# Patient Record
Sex: MALE | URBAN - METROPOLITAN AREA
[De-identification: names, ages, dates, MRNs, and addresses within clinical notes are randomized per-mention and may not be internally consistent; named-entity substitution may affect disease eponyms.]

---

## 2023-01-06 ENCOUNTER — EMERGENCY (EMERGENCY)
Facility: HOSPITAL | Age: 48
LOS: 1 days | Discharge: HOME | End: 2023-01-06
Attending: STUDENT IN AN ORGANIZED HEALTH CARE EDUCATION/TRAINING PROGRAM | Admitting: STUDENT IN AN ORGANIZED HEALTH CARE EDUCATION/TRAINING PROGRAM
Payer: COMMERCIAL

## 2023-01-06 VITALS
HEART RATE: 60 BPM | RESPIRATION RATE: 18 BRPM | WEIGHT: 175.05 LBS | TEMPERATURE: 98 F | DIASTOLIC BLOOD PRESSURE: 122 MMHG | SYSTOLIC BLOOD PRESSURE: 213 MMHG

## 2023-01-06 VITALS
TEMPERATURE: 98 F | SYSTOLIC BLOOD PRESSURE: 176 MMHG | OXYGEN SATURATION: 98 % | HEART RATE: 58 BPM | DIASTOLIC BLOOD PRESSURE: 98 MMHG

## 2023-01-06 LAB
ALBUMIN SERPL ELPH-MCNC: 4.2 G/DL — SIGNIFICANT CHANGE UP (ref 3.3–5)
ALP SERPL-CCNC: 49 U/L — SIGNIFICANT CHANGE UP (ref 40–120)
ALT FLD-CCNC: 50 U/L — SIGNIFICANT CHANGE UP (ref 12–78)
ANION GAP SERPL CALC-SCNC: 7 MMOL/L — SIGNIFICANT CHANGE UP (ref 5–17)
APTT BLD: 27.9 SEC — SIGNIFICANT CHANGE UP (ref 27.5–35.5)
AST SERPL-CCNC: 28 U/L — SIGNIFICANT CHANGE UP (ref 15–37)
BASOPHILS # BLD AUTO: 0.06 K/UL — SIGNIFICANT CHANGE UP (ref 0–0.2)
BASOPHILS NFR BLD AUTO: 0.7 % — SIGNIFICANT CHANGE UP (ref 0–2)
BILIRUB SERPL-MCNC: 0.7 MG/DL — SIGNIFICANT CHANGE UP (ref 0.2–1.2)
BUN SERPL-MCNC: 15 MG/DL — SIGNIFICANT CHANGE UP (ref 7–23)
CALCIUM SERPL-MCNC: 9 MG/DL — SIGNIFICANT CHANGE UP (ref 8.5–10.1)
CHLORIDE SERPL-SCNC: 103 MMOL/L — SIGNIFICANT CHANGE UP (ref 96–108)
CK MB CFR SERPL CALC: 1.3 NG/ML — SIGNIFICANT CHANGE UP (ref 0–3.6)
CO2 SERPL-SCNC: 29 MMOL/L — SIGNIFICANT CHANGE UP (ref 22–31)
CREAT SERPL-MCNC: 1 MG/DL — SIGNIFICANT CHANGE UP (ref 0.5–1.3)
D DIMER BLD IA.RAPID-MCNC: <150 NG/ML DDU — SIGNIFICANT CHANGE UP
EGFR: 93 ML/MIN/1.73M2 — SIGNIFICANT CHANGE UP
EOSINOPHIL # BLD AUTO: 0.17 K/UL — SIGNIFICANT CHANGE UP (ref 0–0.5)
EOSINOPHIL NFR BLD AUTO: 2 % — SIGNIFICANT CHANGE UP (ref 0–6)
GLUCOSE SERPL-MCNC: 104 MG/DL — HIGH (ref 70–99)
HCT VFR BLD CALC: 43.7 % — SIGNIFICANT CHANGE UP (ref 39–50)
HGB BLD-MCNC: 14.3 G/DL — SIGNIFICANT CHANGE UP (ref 13–17)
IMM GRANULOCYTES NFR BLD AUTO: 0.6 % — SIGNIFICANT CHANGE UP (ref 0–0.9)
INR BLD: 0.96 RATIO — SIGNIFICANT CHANGE UP (ref 0.88–1.16)
LYMPHOCYTES # BLD AUTO: 1.49 K/UL — SIGNIFICANT CHANGE UP (ref 1–3.3)
LYMPHOCYTES # BLD AUTO: 17.5 % — SIGNIFICANT CHANGE UP (ref 13–44)
MAGNESIUM SERPL-MCNC: 2.2 MG/DL — SIGNIFICANT CHANGE UP (ref 1.6–2.6)
MCHC RBC-ENTMCNC: 28.4 PG — SIGNIFICANT CHANGE UP (ref 27–34)
MCHC RBC-ENTMCNC: 32.7 GM/DL — SIGNIFICANT CHANGE UP (ref 32–36)
MCV RBC AUTO: 86.9 FL — SIGNIFICANT CHANGE UP (ref 80–100)
MONOCYTES # BLD AUTO: 0.66 K/UL — SIGNIFICANT CHANGE UP (ref 0–0.9)
MONOCYTES NFR BLD AUTO: 7.8 % — SIGNIFICANT CHANGE UP (ref 2–14)
NEUTROPHILS # BLD AUTO: 6.07 K/UL — SIGNIFICANT CHANGE UP (ref 1.8–7.4)
NEUTROPHILS NFR BLD AUTO: 71.4 % — SIGNIFICANT CHANGE UP (ref 43–77)
NRBC # BLD: 0 /100 WBCS — SIGNIFICANT CHANGE UP (ref 0–0)
NT-PROBNP SERPL-SCNC: 53 PG/ML — SIGNIFICANT CHANGE UP (ref 0–125)
PLATELET # BLD AUTO: 280 K/UL — SIGNIFICANT CHANGE UP (ref 150–400)
POTASSIUM SERPL-MCNC: 3.8 MMOL/L — SIGNIFICANT CHANGE UP (ref 3.5–5.3)
POTASSIUM SERPL-SCNC: 3.8 MMOL/L — SIGNIFICANT CHANGE UP (ref 3.5–5.3)
PROT SERPL-MCNC: 8 G/DL — SIGNIFICANT CHANGE UP (ref 6–8.3)
PROTHROM AB SERPL-ACNC: 11.2 SEC — SIGNIFICANT CHANGE UP (ref 10.5–13.4)
RAPID RVP RESULT: SIGNIFICANT CHANGE UP
RBC # BLD: 5.03 M/UL — SIGNIFICANT CHANGE UP (ref 4.2–5.8)
RBC # FLD: 12.4 % — SIGNIFICANT CHANGE UP (ref 10.3–14.5)
SARS-COV-2 RNA SPEC QL NAA+PROBE: SIGNIFICANT CHANGE UP
SODIUM SERPL-SCNC: 139 MMOL/L — SIGNIFICANT CHANGE UP (ref 135–145)
TROPONIN I, HIGH SENSITIVITY RESULT: 11.1 NG/L — SIGNIFICANT CHANGE UP
TROPONIN I, HIGH SENSITIVITY RESULT: 11.9 NG/L — SIGNIFICANT CHANGE UP
WBC # BLD: 8.5 K/UL — SIGNIFICANT CHANGE UP (ref 3.8–10.5)
WBC # FLD AUTO: 8.5 K/UL — SIGNIFICANT CHANGE UP (ref 3.8–10.5)

## 2023-01-06 PROCEDURE — 71045 X-RAY EXAM CHEST 1 VIEW: CPT

## 2023-01-06 PROCEDURE — 99285 EMERGENCY DEPT VISIT HI MDM: CPT | Mod: 25

## 2023-01-06 PROCEDURE — 93010 ELECTROCARDIOGRAM REPORT: CPT

## 2023-01-06 PROCEDURE — 82553 CREATINE MB FRACTION: CPT

## 2023-01-06 PROCEDURE — 85610 PROTHROMBIN TIME: CPT

## 2023-01-06 PROCEDURE — 36415 COLL VENOUS BLD VENIPUNCTURE: CPT

## 2023-01-06 PROCEDURE — 0225U NFCT DS DNA&RNA 21 SARSCOV2: CPT

## 2023-01-06 PROCEDURE — 85730 THROMBOPLASTIN TIME PARTIAL: CPT

## 2023-01-06 PROCEDURE — 93005 ELECTROCARDIOGRAM TRACING: CPT

## 2023-01-06 PROCEDURE — 93306 TTE W/DOPPLER COMPLETE: CPT | Mod: 26

## 2023-01-06 PROCEDURE — 85379 FIBRIN DEGRADATION QUANT: CPT

## 2023-01-06 PROCEDURE — 99285 EMERGENCY DEPT VISIT HI MDM: CPT

## 2023-01-06 PROCEDURE — 93306 TTE W/DOPPLER COMPLETE: CPT

## 2023-01-06 PROCEDURE — 84484 ASSAY OF TROPONIN QUANT: CPT

## 2023-01-06 PROCEDURE — 85025 COMPLETE CBC W/AUTO DIFF WBC: CPT

## 2023-01-06 PROCEDURE — 83880 ASSAY OF NATRIURETIC PEPTIDE: CPT

## 2023-01-06 PROCEDURE — 83735 ASSAY OF MAGNESIUM: CPT

## 2023-01-06 PROCEDURE — 71045 X-RAY EXAM CHEST 1 VIEW: CPT | Mod: 26

## 2023-01-06 PROCEDURE — 80053 COMPREHEN METABOLIC PANEL: CPT

## 2023-01-06 RX ORDER — ASPIRIN/CALCIUM CARB/MAGNESIUM 324 MG
324 TABLET ORAL ONCE
Refills: 0 | Status: COMPLETED | OUTPATIENT
Start: 2023-01-06 | End: 2023-01-06

## 2023-01-06 RX ORDER — LOSARTAN POTASSIUM 100 MG/1
25 TABLET, FILM COATED ORAL ONCE
Refills: 0 | Status: COMPLETED | OUTPATIENT
Start: 2023-01-06 | End: 2023-01-06

## 2023-01-06 RX ORDER — LOSARTAN POTASSIUM 100 MG/1
1 TABLET, FILM COATED ORAL
Qty: 30 | Refills: 0
Start: 2023-01-06 | End: 2023-02-04

## 2023-01-06 RX ADMIN — Medication 324 MILLIGRAM(S): at 16:19

## 2023-01-06 RX ADMIN — LOSARTAN POTASSIUM 25 MILLIGRAM(S): 100 TABLET, FILM COATED ORAL at 22:20

## 2023-01-06 NOTE — CONSULT NOTE ADULT - SUBJECTIVE AND OBJECTIVE BOX
[facility] - Landon France, Shi, Chad, Surya, Epifanio Strong  Office Number: [phone]    Initial Consult Note    CHIEF COMPLAINT: Patient is a 47y old  Male who presents with a chief complaint of     HPI:  47 year old with no known medical history presenting with chest pain with elevated blood pressure and ekg abnormality . Sent from urgent care for elevated blood pressure noted to be 213/ 122 in ed.        PAST MEDICAL & SURGICAL HISTORY:      SOCIAL HISTORY:  No tobacco, ethanol, or drug abuse.    FAMILY HISTORY:    No family history of acute MI or sudden cardiac death.    MEDICATIONS  (STANDING):    MEDICATIONS  (PRN):      Allergies    No Known Allergies    Intolerances        REVIEW OF SYSTEMS:      All other review of systems is negative unless indicated above    VITAL SIGNS:   Vital Signs Last 24 Hrs  T(C): 36.8 (06 Jan 2023 15:30), Max: 36.8 (06 Jan 2023 15:30)  T(F): 98.3 (06 Jan 2023 15:30), Max: 98.3 (06 Jan 2023 15:30)  HR: 60 (06 Jan 2023 15:30) (60 - 60)  BP: 213/122 (06 Jan 2023 15:30) (213/122 - 213/122)  BP(mean): --  RR: 18 (06 Jan 2023 15:30) (18 - 18)  SpO2: --    Parameters below as of 06 Jan 2023 15:30  Patient On (Oxygen Delivery Method): room air        I&O's Summary      On Exam:    Constitutional: NAD, alert and oriented x 3  Lungs:  Non-labored, breath sounds are clear bilaterally, No wheezing, rales or rhonchi  Cardiovascular: RRR.  S1 and S2 positive.  No murmurs, rubs, gallops or clicks  Gastrointestinal: Bowel Sounds present, soft, nontender.   Lymph: No peripheral edema. No cervical lymphadenopathy.  Neurological: Alert, no focal deficits  Skin: No rashes or ulcers   Psych:  Mood & affect appropriate.    LABS: All Labs Reviewed:                        14.3   8.50  )-----------( 280      ( 06 Jan 2023 16:10 )             43.7     06 Jan 2023 16:10    139    |  103    |  15     ----------------------------<  104    3.8     |  29     |  1.00     Ca    9.0        06 Jan 2023 16:10  Mg     2.2       06 Jan 2023 16:10    TPro  8.0    /  Alb  4.2    /  TBili  0.7    /  DBili  x      /  AST  28     /  ALT  50     /  AlkPhos  49     06 Jan 2023 16:10    PT/INR - ( 06 Jan 2023 16:10 )   PT: 11.2 sec;   INR: 0.96 ratio         PTT - ( 06 Jan 2023 16:10 )  PTT:27.9 sec  CARDIAC MARKERS ( 06 Jan 2023 16:10 )  x     / x     / x     / x     / 1.3 ng/mL      Blood Culture:   01-06 @ 16:10  Pro Bnp 53        RADIOLOGY:    EKG:         [facility] - Landon France, Shi, Chad, Surya, Epifanio Strong  Office Number: [phone]    Initial Consult Note    CHIEF COMPLAINT: Patient is a 47y old  Male who presents with a chief complaint of   chest pain.     HPI:  47 year old with no known medical history presenting with chest pain with elevated blood pressure and ekg abnormality . Sent from urgent care for elevated blood pressure noted to be 213/ 122 in ed.        PAST MEDICAL & SURGICAL HISTORY:      SOCIAL HISTORY:  No tobacco, ethanol, or drug abuse.    FAMILY HISTORY:    No family history of acute MI or sudden cardiac death.    MEDICATIONS  (STANDING):    MEDICATIONS  (PRN):      Allergies    No Known Allergies    Intolerances        REVIEW OF SYSTEMS:      All other review of systems is negative unless indicated above    VITAL SIGNS:   Vital Signs Last 24 Hrs  T(C): 36.8 (06 Jan 2023 15:30), Max: 36.8 (06 Jan 2023 15:30)  T(F): 98.3 (06 Jan 2023 15:30), Max: 98.3 (06 Jan 2023 15:30)  HR: 60 (06 Jan 2023 15:30) (60 - 60)  BP: 213/122 (06 Jan 2023 15:30) (213/122 - 213/122)  BP(mean): --  RR: 18 (06 Jan 2023 15:30) (18 - 18)  SpO2: --    Parameters below as of 06 Jan 2023 15:30  Patient On (Oxygen Delivery Method): room air        I&O's Summary      On Exam:    Constitutional: NAD, alert and oriented x 3  Lungs:  Non-labored, breath sounds are clear bilaterally, No wheezing, rales or rhonchi  Cardiovascular: RRR.  S1 and S2 positive.  No murmurs, rubs, gallops or clicks  Gastrointestinal: Bowel Sounds present, soft, nontender.   Lymph: No peripheral edema. No cervical lymphadenopathy.  Neurological: Alert, no focal deficits  Skin: No rashes or ulcers   Psych:  Mood & affect appropriate.    LABS: All Labs Reviewed:                        14.3   8.50  )-----------( 280      ( 06 Jan 2023 16:10 )             43.7     06 Jan 2023 16:10    139    |  103    |  15     ----------------------------<  104    3.8     |  29     |  1.00     Ca    9.0        06 Jan 2023 16:10  Mg     2.2       06 Jan 2023 16:10    TPro  8.0    /  Alb  4.2    /  TBili  0.7    /  DBili  x      /  AST  28     /  ALT  50     /  AlkPhos  49     06 Jan 2023 16:10    PT/INR - ( 06 Jan 2023 16:10 )   PT: 11.2 sec;   INR: 0.96 ratio         PTT - ( 06 Jan 2023 16:10 )  PTT:27.9 sec  CARDIAC MARKERS ( 06 Jan 2023 16:10 )  x     / x     / x     / x     / 1.3 ng/mL      Blood Culture:   01-06 @ 16:10  Pro Bnp 53        RADIOLOGY:    EKG:         [facility] - Chad Navarrete Pannella, Patel, Savella  Office Number: [phone]    Initial Consult Note    CHIEF COMPLAINT: Patient is a 47y old  Male who presents with a chief complaint of chest pain  chest pain.     HPI:  Patient with no significant past medical history is presenting with concern for chest pain.  Had 2 episodes of chest pain this morning around 5 AM that were sudden onset localized to the left side of his chest.  Lasted for about 10 seconds and described as sharp.  Not exertional over the course the day, however has had multiple episodes with urgent care and was sent to ED for abnormal EKG.  No history of PE or DVT.  No recent travel history of malignancy. non smoker, no family hx of sudden cardiac death.    No history of HTN  has been under alot of stress lately  no history of premature cad in family  exercises regularly and 2 days ago, without issue.      PAST MEDICAL & SURGICAL HISTORY:      SOCIAL HISTORY:  No tobacco, ethanol, or drug abuse.    FAMILY HISTORY:    No family history of acute MI or sudden cardiac death.    MEDICATIONS  (STANDING):    MEDICATIONS  (PRN):      Allergies    No Known Allergies    Intolerances        REVIEW OF SYSTEMS:      All other review of systems is negative unless indicated above    VITAL SIGNS:   Vital Signs Last 24 Hrs  T(C): 36.8 (06 Jan 2023 15:30), Max: 36.8 (06 Jan 2023 15:30)  T(F): 98.3 (06 Jan 2023 15:30), Max: 98.3 (06 Jan 2023 15:30)  HR: 60 (06 Jan 2023 15:30) (60 - 60)  BP: 213/122 (06 Jan 2023 15:30) (213/122 - 213/122)  BP(mean): --  RR: 18 (06 Jan 2023 15:30) (18 - 18)  SpO2: --    Parameters below as of 06 Jan 2023 15:30  Patient On (Oxygen Delivery Method): room air        I&O's Summary      On Exam:    Constitutional: NAD, alert and oriented x 3  Lungs:  Non-labored, breath sounds are clear bilaterally, No wheezing, rales or rhonchi  Cardiovascular: RRR.  S1 and S2 positive.  No murmurs, rubs, gallops or clicks  Gastrointestinal: Bowel Sounds present, soft, nontender.   Lymph: No peripheral edema. No cervical lymphadenopathy.  Neurological: Alert, no focal deficits  Skin: No rashes or ulcers   Psych:  Mood & affect appropriate.    LABS: All Labs Reviewed:                        14.3   8.50  )-----------( 280      ( 06 Jan 2023 16:10 )             43.7     06 Jan 2023 16:10    139    |  103    |  15     ----------------------------<  104    3.8     |  29     |  1.00     Ca    9.0        06 Jan 2023 16:10  Mg     2.2       06 Jan 2023 16:10    TPro  8.0    /  Alb  4.2    /  TBili  0.7    /  DBili  x      /  AST  28     /  ALT  50     /  AlkPhos  49     06 Jan 2023 16:10    PT/INR - ( 06 Jan 2023 16:10 )   PT: 11.2 sec;   INR: 0.96 ratio         PTT - ( 06 Jan 2023 16:10 )  PTT:27.9 sec  CARDIAC MARKERS ( 06 Jan 2023 16:10 )  x     / x     / x     / x     / 1.3 ng/mL      Blood Culture:   01-06 @ 16:10  Pro Bnp 53        RADIOLOGY:    EKG: sr, lvh, lat and inf st abn

## 2023-01-06 NOTE — ED PROVIDER NOTE - PHYSICAL EXAMINATION
Constitutional: Awake, Alert, non-toxic. No acute distress. Well appearing, well nourished.   HEAD: Normocephalic, atraumatic.   EYES: PERRL, EOM intact, conjunctiva and sclera are clear bilaterally.  ENT: External ears normal. No rhinorrhea, no tracheal deviation   NECK: Supple, non-tender  CARDIOVASCULAR: bradycardic rate and rhythm.  RESPIRATORY: Normal respiratory effort; breath sounds CTAB, no wheezes, rhonchi, or rales. Speaking in full sentences. No accessory muscle use.   ABDOMEN: Soft; non-tender, non-distended. No rebound or guarding.   EXTREMITIES:  no lower extremity edema, no deformities  SKIN: Warm, dry  NEURO: A&O x3. Sensory and motor functions are grossly intact. Speech is normal. No facial droop.  PSYCH: Appearance and judgement seem appropriate for gender and age.

## 2023-01-06 NOTE — ED PROVIDER NOTE - PROGRESS NOTE DETAILS
per dr. leyva, will get echo here tonight, treat bp accordingly and follow up pending echo results. Daniel Jolly MD. Patient seen by Dr. Godfrey from cardiology.  Had echocardiogram that was reportedly within normal limits with no LV dysfunction.  Per discussion with Dr. Godfrey, will start patient on losartan which the first dose was ordered here tonight.  Patient will have outpatient cardiology follow-up.  Feels well.  2 troponins were negative as well as negative D-dimer.  Plan to discharge patient. Return to ED precautions were discussed with the patient/family. All questions were answered. Daniel Jolly MD.

## 2023-01-06 NOTE — ED PROVIDER NOTE - NSFOLLOWUPINSTRUCTIONS_ED_ALL_ED_FT
Nonspecific Chest Pain, Adult    Chest pain is an uncomfortable, tight, or painful feeling in the chest. The pain can feel like a crushing, aching, or squeezing pressure. A person can feel a burning or tingling sensation. Chest pain can also be felt in your back, neck, jaw, shoulder, or arm. This pain can be worse when you move, sneeze, or take a deep breath.    Chest pain can be caused by a condition that is life-threatening. This must be treated right away. It can also be caused by something that is not life-threatening. If you have chest pain, it can be hard to know the difference, so it is important to get help right away to make sure that you do not have a serious condition.    Some life-threatening causes of chest pain include:  •Heart attack.  •A tear in the body's main blood vessel (aortic dissection).  •Inflammation around your heart (pericarditis).  •A problem in the lungs, such as a blood clot (pulmonary embolism) or a collapsed lung (pneumothorax).    Some non life-threatening causes of chest pain include:  •Heartburn.  •Anxiety or stress.  •Damage to the bones, muscles, and cartilage that make up your chest wall.  •Pneumonia or bronchitis.  •Shingles infection (varicella-zoster virus).    Your chest pain may come and go. It may also be constant. Your health care provider will do tests and other studies to find the cause of your pain. Treatment will depend on the cause of your chest pain.    Follow these instructions at home:    Medicines   •Take over-the-counter and prescription medicines only as told by your health care provider.  •If you were prescribed an antibiotic medicine, take it as told by your health care provider. Do not stop taking the antibiotic even if you start to feel better.    Activity   •Avoid any activities that cause chest pain.  • Do not lift anything that is heavier than 10 lb (4.5 kg), or the limit that you are told, until your health care provider says that it is safe.  •Rest as directed by your health care provider.   •Return to your normal activities only as told by your health care provider. Ask your health care provider what activities are safe for you.    Lifestyle:  A plate along with examples of foods in a healthy diet.   Silhouette of a person sitting on the floor doing yoga.   • Do not use any products that contain nicotine or tobacco, such as cigarettes, e-cigarettes, and chewing tobacco. If you need help quitting, ask your health care provider.  • Do not drink alcohol.  •Make healthy lifestyle changes as recommended. These may include:  •Getting regular exercise. Ask your health care provider to suggest some exercises that are safe for you.  •Eating a heart-healthy diet. This includes plenty of fresh fruits and vegetables, whole grains, low-fat (lean) protein, and low-fat dairy products. A dietitian can help you find healthy eating options.  •Maintaining a healthy weight.  •Managing any other health conditions you may have, such as high blood pressure (hypertension) or diabetes.  •Reducing stress, such as with yoga or relaxation techniques.    General instructions   •Pay attention to any changes in your symptoms.  •It is up to you to get the results of any tests that were done. Ask your health care provider, or the department that is doing the tests, when your results will be ready.  •Keep all follow-up visits as told by your health care provider. This is important.   •You may be asked to go for further testing if your chest pain does not go away.    Contact a health care provider if:  •Your chest pain does not go away.  •You feel depressed.  •You have a fever.  •You notice changes in your symptoms or develop new symptoms.    Get help right away if:  •Your chest pain gets worse.  •You have a cough that gets worse, or you cough up blood.  •You have severe pain in your abdomen.  •You faint.  •You have sudden, unexplained chest discomfort.  •You have sudden, unexplained discomfort in your arms, back, neck, or jaw.  •You have shortness of breath at any time.  •You suddenly start to sweat, or your skin gets clammy.  •You feel nausea or you vomit.  •You suddenly feel lightheaded or dizzy.  •You have severe weakness, or unexplained weakness or fatigue.  •Your heart begins to beat quickly, or it feels like it is skipping beats.    These symptoms may represent a serious problem that is an emergency. Do not wait to see if the symptoms will go away. Get medical help right away. Call your local emergency services (911 in the [place] Do not drive yourself to the hospital.     Summary  •Chest pain can be caused by a condition that is serious and requires urgent treatment. It may also be caused by something that is not life-threatening.  •Your health care provider may do lab tests and other studies to find the cause of your pain.  •Follow your health care provider's instructions on taking medicines, making lifestyle changes, and getting emergency treatment if symptoms become worse.  •Keep all follow-up visits as told by your health care provider. This includes visits for any further testing if your chest pain does not go away.  This information is not intended to replace advice given to you by your health care provider. Make sure you discuss any questions you have with your health care provider.    Document Revised: 03/03/2022 Document Reviewed: 03/03/2022    Elsevier Patient Education © 2022 [employer]

## 2023-01-06 NOTE — ED ADULT NURSE NOTE - CINV DISCH TEACH PARTICIP
Render In Strict Bullet Format?: No Detail Level: Zone Initiate Treatment: fluorouracil 5 % cream twice daily x3 weeks Initiate Treatment: Metronidazole cream twice daily Patient/Spouse

## 2023-01-06 NOTE — ED PROVIDER NOTE - CARE PROVIDER_API CALL
Jaxson Godfrey)  Cardiovascular Disease; Internal Medicine  [address], [place] [id number]  Phone: [phone]  Fax: [phone]  Follow Up Time: 4-6 Days

## 2023-01-06 NOTE — ED PROVIDER NOTE - PATIENT PORTAL LINK FT
You can access the FollowMyHealth Patient Portal offered by [facility] by registering at the following website: [email or web address]. By joining [employer]’s FollowMyHealth portal, you will also be able to view your health information using other applications (apps) compatible with our system.

## 2023-01-06 NOTE — CONSULT NOTE ADULT - ASSESSMENT
47 year old with no known medical history presenting with chest pain with elevated blood pressure     EKG with SR with LVH   troponin negative x1 - follow up second set  bp 213/122 in ed, pulse 60, use hydralazine for bp control   admit to tele   can check baseline 2decho   bnp 53 euvolemic on exam     further plan pending clinical course and results of above   Monitor and replete electrolytes. Keep K>4.0 and Mg>2.0.  Hanane Zaragoza FNP-C  Cardiology NP  [facility] [phone]     47 year old with no known medical history presenting with chest pain with elevated blood pressure     EKG with SR with LVH   troponin negative x1 - follow up second set, asa given in ed   d dimer pending   bp 213/122 in ed, pulse 60, use hydralazine for bp control   admit to tele   can check baseline 2decho   bnp 53 euvolemic on exam , chest xray pending   further plan pending clinical course and results of above   Monitor and replete electrolytes. Keep K>4.0 and Mg>2.0.  Hanane Zaargoza FNP-C  Cardiology NP  [facility] [phone]     47 year old with no known medical history presenting with chest pain with elevated blood pressure.    - chest pain atypical, though possibly in the setting of uncontrolled htn  - ekg shows a sr with lvh and an st abn inferiorly and in the anterolateral precordium  - ekg abn could all be lvh related (also, inf st abn was present on an old ekg)  - 1st troponin is negative, and would repeat in 3 hours  - to have echocardiogram  - asa 81 mg daily    - Bp notably elevated, without history  - iv hydralazine x 1  - will likely add arb at time of dc    - if bp controlled, and ce are negative, hopefully can dc home  - he needs to follow up closely in the office and will make an appt in 1-2 weeks.

## 2023-01-06 NOTE — ED ADULT NURSE NOTE - TEMPLATE
“You can access the FollowHealth Patient Portal, offered by [facility], by registering with the following website: [email or web address]” Cardiac

## 2023-01-06 NOTE — ED ADULT NURSE NOTE - WAS YOUR LAST COVID-19 VACCINE GREATER THAN OR EQUAL TO TWO MONTHS AGO?
"Routing refill request to provider for review/approval because:  Labs not current:  Cr    Last Written Prescription Date:  2/21/22  Last Fill Quantity: ?,  # refills: ?   Last office visit provider:  9/21/21     Requested Prescriptions   Pending Prescriptions Disp Refills     pantoprazole (PROTONIX) 40 MG EC tablet [Pharmacy Med Name: PANTOPRAZOLE 40MG TABLETS] 90 tablet      Sig: TAKE 1 TABLET BY MOUTH EVERY DAY       PPI Protocol Passed - 5/5/2022 10:06 AM        Passed - Not on Clopidogrel (unless Pantoprazole ordered)        Passed - No diagnosis of osteoporosis on record        Passed - Recent (12 mo) or future (30 days) visit within the authorizing provider's specialty     Patient has had an office visit with the authorizing provider or a provider within the authorizing providers department within the previous 12 mos or has a future within next 30 days. See \"Patient Info\" tab in inbasket, or \"Choose Columns\" in Meds & Orders section of the refill encounter.              Passed - Medication is active on med list        Passed - Patient is age 18 or older        Passed - No active pregnacy on record        Passed - No positive pregnancy test in past 12 months           levothyroxine (SYNTHROID/LEVOTHROID) 100 MCG tablet [Pharmacy Med Name: LEVOTHYROXINE 0.100MG (100MCG) TAB] 30 tablet      Sig: TAKE 1 TABLET BY MOUTH EVERY DAY       Thyroid Protocol Passed - 5/5/2022 10:06 AM        Passed - Patient is 12 years or older        Passed - Recent (12 mo) or future (30 days) visit within the authorizing provider's specialty     Patient has had an office visit with the authorizing provider or a provider within the authorizing providers department within the previous 12 mos or has a future within next 30 days. See \"Patient Info\" tab in inbasket, or \"Choose Columns\" in Meds & Orders section of the refill encounter.              Passed - Medication is active on med list        Passed - Normal TSH on file in past 12 " "months     Recent Labs   Lab Test 09/21/21  1226   TSH 3.01              Passed - No active pregnancy on record     If patient is pregnant or has had a positive pregnancy test, please check TSH.          Passed - No positive pregnancy test in past 12 months     If patient is pregnant or has had a positive pregnancy test, please check TSH.             amLODIPine (NORVASC) 2.5 MG tablet [Pharmacy Med Name: AMLODIPINE BESYLATE 2.5MG TABLETS] 90 tablet 3     Sig: TAKE 1 TABLET BY MOUTH EVERY DAY       Calcium Channel Blockers Protocol  Failed - 5/5/2022 10:06 AM        Failed - Normal serum creatinine on file in past 12 months     Recent Labs   Lab Test 02/25/22  0520   CR 1.65*       Ok to refill medication if creatinine is low          Passed - Blood pressure under 140/90 in past 12 months     BP Readings from Last 3 Encounters:   03/11/22 115/77   03/08/22 124/65   03/03/22 121/68                 Passed - Recent (12 mo) or future (30 days) visit within the authorizing provider's specialty     Patient has had an office visit with the authorizing provider or a provider within the authorizing providers department within the previous 12 mos or has a future within next 30 days. See \"Patient Info\" tab in inbasket, or \"Choose Columns\" in Meds & Orders section of the refill encounter.              Passed - Medication is active on med list        Passed - Patient is age 18 or older        Passed - No active pregnancy on record        Passed - No positive pregnancy test in past 12 months             Iza Galvez 05/08/22 2:40 PM    " No

## 2023-01-06 NOTE — ED PROVIDER NOTE - OBJECTIVE STATEMENT
Patient with no significant past medical history is presenting with concern for chest pain.  Had 2 episodes of chest pain this morning around 5 AM that were sudden onset localized to the left side of his chest.  Lasted for about 10 seconds and described as sharp.  Not exertional over the course the day, however has had multiple episodes with urgent care and was sent to ED for abnormal EKG.  No history of PE or DVT.  No recent travel history of malignancy. Patient with no significant past medical history is presenting with concern for chest pain.  Had 2 episodes of chest pain this morning around 5 AM that were sudden onset localized to the left side of his chest.  Lasted for about 10 seconds and described as sharp.  Not exertional over the course the day, however has had multiple episodes with urgent care and was sent to ED for abnormal EKG.  No history of PE or DVT.  No recent travel history of malignancy. non smoker, no family hx of sudden cardiac death.

## 2023-01-06 NOTE — ED PROVIDER NOTE - CLINICAL SUMMARY MEDICAL DECISION MAKING FREE TEXT BOX
Patient with concerning EKG here with inferior T wave inversions, possible underlying ACS though first troponin is negative.  We will also check D-dimer for rule out PE.  Patient is hypertensive here but without any pain rating towards his back less likely dissection.  Chest x-ray also has narrow mediastinum.  I consulted on-call cardiologist who will review case and get back to me.  Patient given aspirin.  Pending second troponin.

## 2023-01-09 PROBLEM — Z00.00 ENCOUNTER FOR PREVENTIVE HEALTH EXAMINATION: Status: ACTIVE | Noted: 2023-01-09

## 2023-01-11 ENCOUNTER — APPOINTMENT (OUTPATIENT)
Dept: CARDIOLOGY | Facility: CLINIC | Age: 48
End: 2023-01-11
Payer: COMMERCIAL

## 2023-01-11 ENCOUNTER — NON-APPOINTMENT (OUTPATIENT)
Age: 48
End: 2023-01-11

## 2023-01-11 VITALS
BODY MASS INDEX: 25.92 KG/M2 | OXYGEN SATURATION: 99 % | DIASTOLIC BLOOD PRESSURE: 114 MMHG | WEIGHT: 175 LBS | HEART RATE: 61 BPM | HEIGHT: 69 IN | SYSTOLIC BLOOD PRESSURE: 189 MMHG

## 2023-01-11 VITALS — SYSTOLIC BLOOD PRESSURE: 145 MMHG | DIASTOLIC BLOOD PRESSURE: 95 MMHG

## 2023-01-11 DIAGNOSIS — R07.9 CHEST PAIN, UNSPECIFIED: ICD-10-CM

## 2023-01-11 DIAGNOSIS — I10 ESSENTIAL (PRIMARY) HYPERTENSION: ICD-10-CM

## 2023-01-11 PROCEDURE — 99214 OFFICE O/P EST MOD 30 MIN: CPT | Mod: 25

## 2023-01-11 PROCEDURE — 93000 ELECTROCARDIOGRAM COMPLETE: CPT

## 2023-01-11 NOTE — DISCUSSION/SUMMARY
[FreeTextEntry1] : Julio recently presented with chest pain, and a significantly elevated blood pressure.  His EKG continues to demonstrate a sinus rhythm, with a diffuse T wave abnormality, and suggestion of left ventricular hypertrophy.  His blood pressure is elevated today, with slight improvement on repeat evaluation.\par \par It is not clear to me what has been driving his elevated blood pressures.  Though he has been under a lot of stress, he seems to be taking care of himself.  His echocardiogram confirmed normal left ventricular systolic function, without definite left ventricular hypertrophy, though the apex did appear foreshortened in most views.\par \par In the setting of chest pain and an abnormal EKG, I am setting him up for a coronary CTA with calcium score for further evaluation.  After this is resulted, we may need to evaluate him for other causes of hypertension.  I will change his losartan to olmesartan 20.  He will limit his salt intake, and stay active.  We will speak after the above testing, and arrange follow-up. [EKG obtained to assist in diagnosis and management of assessed problem(s)] : EKG obtained to assist in diagnosis and management of assessed problem(s)

## 2023-01-11 NOTE — HISTORY OF PRESENT ILLNESS
[FreeTextEntry1] : Julio is a 47 year old male here for evaluation.\par \par He is generally healthy.  I met him in the emergency room on 1/6/2023.  He presented with chest discomfort and an abnormal EKG from urgent care.  He had some atypical chest pain the morning of presentation, and an elevated blood pressure.  In the ER, evaluation including 2 cardiac troponins was unremarkable.  His EKG demonstrates sinus rhythm, with a diffuse T wave abnormality, with suggestion of left ventricular hypertrophy.  An echocardiogram demonstrated normal left ventricular systolic function, with mild valvular pathology.  He was discharged home on losartan 25.\par \par Since discharge, he feels well.  His blood pressure has been labile, with numbers in the 130 range this morning, though 150s later in the day.  He has no additional chest pain, and has been more active.\par \par There is no family history of premature CAD.  He denies toxic habits.\par \par \par \par

## 2023-01-12 ENCOUNTER — APPOINTMENT (OUTPATIENT)
Dept: CT IMAGING | Facility: CLINIC | Age: 48
End: 2023-01-12

## 2023-01-18 ENCOUNTER — APPOINTMENT (OUTPATIENT)
Dept: CT IMAGING | Facility: CLINIC | Age: 48
End: 2023-01-18
Payer: COMMERCIAL

## 2023-01-18 ENCOUNTER — OUTPATIENT (OUTPATIENT)
Dept: OUTPATIENT SERVICES | Facility: HOSPITAL | Age: 48
LOS: 1 days | End: 2023-01-18
Payer: COMMERCIAL

## 2023-01-18 DIAGNOSIS — R07.9 CHEST PAIN, UNSPECIFIED: ICD-10-CM

## 2023-01-18 PROCEDURE — 75574 CT ANGIO HRT W/3D IMAGE: CPT | Mod: 26

## 2023-01-18 PROCEDURE — 75574 CT ANGIO HRT W/3D IMAGE: CPT

## 2023-01-26 DIAGNOSIS — I25.84 ATHEROSCLEROTIC HEART DISEASE OF NATIVE CORONARY ARTERY W/OUT ANGINA PECTORIS: ICD-10-CM

## 2023-01-26 DIAGNOSIS — I25.10 ATHEROSCLEROTIC HEART DISEASE OF NATIVE CORONARY ARTERY W/OUT ANGINA PECTORIS: ICD-10-CM

## 2023-01-27 ENCOUNTER — RESULT REVIEW (OUTPATIENT)
Age: 48
End: 2023-01-27

## 2023-01-27 ENCOUNTER — OUTPATIENT (OUTPATIENT)
Dept: OUTPATIENT SERVICES | Facility: HOSPITAL | Age: 48
LOS: 1 days | End: 2023-01-27
Payer: COMMERCIAL

## 2023-01-27 DIAGNOSIS — Z00.8 ENCOUNTER FOR OTHER GENERAL EXAMINATION: ICD-10-CM

## 2023-01-27 LAB
ALBUMIN SERPL ELPH-MCNC: 4.3 G/DL
ALP BLD-CCNC: 66 U/L
ALT SERPL-CCNC: 39 U/L
ANION GAP SERPL CALC-SCNC: 10 MMOL/L
AST SERPL-CCNC: 31 U/L
BILIRUB SERPL-MCNC: 0.6 MG/DL
BUN SERPL-MCNC: 14 MG/DL
CALCIUM SERPL-MCNC: 9.5 MG/DL
CHLORIDE SERPL-SCNC: 102 MMOL/L
CHOLEST SERPL-MCNC: 190 MG/DL
CO2 SERPL-SCNC: 27 MMOL/L
CREAT SERPL-MCNC: 1.05 MG/DL
EGFR: 88 ML/MIN/1.73M2
ESTIMATED AVERAGE GLUCOSE: 105 MG/DL
GLUCOSE SERPL-MCNC: 84 MG/DL
HBA1C MFR BLD HPLC: 5.3 %
HDLC SERPL-MCNC: 57 MG/DL
LDLC SERPL CALC-MCNC: 121 MG/DL
NONHDLC SERPL-MCNC: 133 MG/DL
POTASSIUM SERPL-SCNC: 4.6 MMOL/L
PROT SERPL-MCNC: 6.9 G/DL
SODIUM SERPL-SCNC: 140 MMOL/L
TRIGL SERPL-MCNC: 61 MG/DL
TSH SERPL-ACNC: 2.31 UIU/ML

## 2023-01-27 PROCEDURE — 0502T: CPT

## 2023-01-27 PROCEDURE — 0504T: CPT

## 2023-01-27 PROCEDURE — 0503T: CPT

## 2023-02-07 LAB — APO LP(A) SERPL-MCNC: 176.1 NMOL/L

## 2023-03-10 ENCOUNTER — APPOINTMENT (OUTPATIENT)
Dept: CARDIOLOGY | Facility: CLINIC | Age: 48
End: 2023-03-10
Payer: COMMERCIAL

## 2023-03-10 ENCOUNTER — NON-APPOINTMENT (OUTPATIENT)
Age: 48
End: 2023-03-10

## 2023-03-10 VITALS — BODY MASS INDEX: 26.81 KG/M2 | HEIGHT: 69 IN | WEIGHT: 181 LBS | HEART RATE: 61 BPM | OXYGEN SATURATION: 98 %

## 2023-03-10 VITALS — SYSTOLIC BLOOD PRESSURE: 140 MMHG | DIASTOLIC BLOOD PRESSURE: 85 MMHG

## 2023-03-10 DIAGNOSIS — R03.0 ELEVATED BLOOD-PRESSURE READING, W/OUT DIAGNOSIS OF HYPERTENSION: ICD-10-CM

## 2023-03-10 PROCEDURE — 93000 ELECTROCARDIOGRAM COMPLETE: CPT

## 2023-03-10 PROCEDURE — 99214 OFFICE O/P EST MOD 30 MIN: CPT | Mod: 25

## 2023-03-11 NOTE — HISTORY OF PRESENT ILLNESS
[FreeTextEntry1] : Julio is a 47 year old male here for evaluation.\par \par He is generally healthy.  I met him in the emergency room on 1/6/2023.  He presented with chest discomfort and an abnormal EKG from urgent care.  He had some atypical chest pain the morning of presentation, and an elevated blood pressure.  In the ER, evaluation including 2 cardiac troponins was unremarkable.  His EKG demonstrated sinus rhythm, with a diffuse T wave abnormality, with suggestion of left ventricular hypertrophy.  An echocardiogram demonstrated normal left ventricular systolic function, with mild valvular pathology.  He was discharged home on losartan 25.\par \par I last saw him 1/11/23.\par Since this time, he feels well.  His blood pressure has been labile, with numbers ranging from 130s to 150s. He did have some more elevated numbers, and he is now on olmesartan 20 bid and amlodipine 5. He remains active and has no chest pain, dyspnea or palpitations.\par He had a coronary CTA which showed a calcium score of 205, with mild stenosis of his proximal and mid LAD. BW was notable for an LDL of 121.\par \par There is no family history of premature CAD.  He denies toxic habits.\par \par \par \par

## 2023-03-11 NOTE — DISCUSSION/SUMMARY
[FreeTextEntry1] : Julio recently presented with chest pain, and a significantly elevated blood pressure.  His EKG continues to demonstrate a sinus rhythm, with a diffuse T wave abnormality, and suggestion of left ventricular hypertrophy.  His blood pressure is elevated today, with slight improvement on repeat evaluation.\par \par It is not clear to me what has been driving his elevated blood pressures.  Though he has been under a lot of stress, he seems to be taking care of himself.  His echocardiogram confirmed normal left ventricular systolic function, without definite left ventricular hypertrophy, though the apex did appear foreshortened in most views.\par \par He will remain on his current BP regimen, and I am sending repeat blood work, to rule out some secondary causes of HTN. He will have a renal doppler to evaluate for EMILE. \par \par He has mild CAD on CTA, and will need a statin, once we optimize his BP regimen. I am setting him up for a 24 hour ambulatory monitor to evaluate his trend at home. \par \par We will speak after the above testing, and arrange follow-up.

## 2023-03-15 ENCOUNTER — APPOINTMENT (OUTPATIENT)
Dept: CARDIOLOGY | Facility: CLINIC | Age: 48
End: 2023-03-15
Payer: COMMERCIAL

## 2023-03-15 PROCEDURE — 93790 AMBL BP MNTR W/SW I&R: CPT

## 2023-04-24 RX ORDER — OLMESARTAN MEDOXOMIL 20 MG/1
20 TABLET, FILM COATED ORAL
Qty: 90 | Refills: 2 | Status: DISCONTINUED | COMMUNITY
Start: 2023-01-11 | End: 2023-04-24

## 2023-06-02 ENCOUNTER — NON-APPOINTMENT (OUTPATIENT)
Age: 48
End: 2023-06-02

## 2023-06-02 ENCOUNTER — APPOINTMENT (OUTPATIENT)
Dept: CARDIOLOGY | Facility: CLINIC | Age: 48
End: 2023-06-02
Payer: COMMERCIAL

## 2023-06-02 VITALS — DIASTOLIC BLOOD PRESSURE: 82 MMHG | SYSTOLIC BLOOD PRESSURE: 138 MMHG

## 2023-06-02 VITALS
HEIGHT: 69 IN | BODY MASS INDEX: 25.92 KG/M2 | SYSTOLIC BLOOD PRESSURE: 146 MMHG | HEART RATE: 54 BPM | OXYGEN SATURATION: 98 % | DIASTOLIC BLOOD PRESSURE: 93 MMHG | WEIGHT: 175 LBS

## 2023-06-02 DIAGNOSIS — I10 ESSENTIAL (PRIMARY) HYPERTENSION: ICD-10-CM

## 2023-06-02 DIAGNOSIS — I51.7 CARDIOMEGALY: ICD-10-CM

## 2023-06-02 LAB
APPEARANCE: CLEAR
BACTERIA: NEGATIVE /HPF
BILIRUBIN URINE: NEGATIVE
BLOOD URINE: NEGATIVE
CAST: 0 /LPF
COLOR: YELLOW
EPITHELIAL CELLS: 0 /HPF
GLUCOSE QUALITATIVE U: NEGATIVE MG/DL
KETONES URINE: NEGATIVE MG/DL
LEUKOCYTE ESTERASE URINE: ABNORMAL
MICROSCOPIC-UA: NORMAL
NITRITE URINE: NEGATIVE
PH URINE: 6.5
PROTEIN URINE: NEGATIVE MG/DL
RED BLOOD CELLS URINE: 0 /HPF
SPECIFIC GRAVITY URINE: 1
UROBILINOGEN URINE: 0.2 MG/DL
WHITE BLOOD CELLS URINE: 0 /HPF

## 2023-06-02 PROCEDURE — 93000 ELECTROCARDIOGRAM COMPLETE: CPT

## 2023-06-02 PROCEDURE — 99214 OFFICE O/P EST MOD 30 MIN: CPT | Mod: 25

## 2023-06-02 NOTE — DISCUSSION/SUMMARY
[FreeTextEntry1] : Julio recently presented with chest pain, and a significantly elevated blood pressure.  His EKG continues to demonstrate a sinus rhythm, with a diffuse T wave abnormality, and suggestion of left ventricular hypertrophy.  His blood pressure is elevated, though with improvement on repeat evaluation.\par \par He will remain on amlodipine 5 mg twice daily, and off of olmesartan.  I am repeating a full set of blood work today.  He will continue to monitor his blood pressure at home.\par \par An echocardiogram during his hospitalization did not show definitive left ventricular hypertrophy, though the apex was foreshortened and appeared thickened.  Given his elevated blood pressures, and notably abnormal EKG with suggestion of LVH, he is going to have a cardiac MRI, to evaluate for apical HCM\par \par He has been under a lot of stress, and has been eating more poorly.  I have stressed the importance of cutting down on his cured meat and cheese intake.\par \par He has mild CAD on CTA, and I am starting him on Crestor 10 mg.\par \par We will speak after the blood work.  I will see him again in 3 months. [EKG obtained to assist in diagnosis and management of assessed problem(s)] : EKG obtained to assist in diagnosis and management of assessed problem(s)

## 2023-06-02 NOTE — HISTORY OF PRESENT ILLNESS
[FreeTextEntry1] : Julio is a 47 year old male here for follow up.\par \par He is generally healthy.  I met him in the emergency room on 1/6/2023.  He presented with chest discomfort and an abnormal EKG from urgent care.  He had some atypical chest pain the morning of presentation, and an elevated blood pressure.  In the ER, evaluation including 2 cardiac troponins was unremarkable.  His EKG demonstrated sinus rhythm, with a diffuse T wave abnormality, with suggestion of left ventricular hypertrophy.  An echocardiogram demonstrated normal left ventricular systolic function, with mild valvular pathology.  He was discharged home on losartan 25.\par \par I last saw him 3/23. A blood pressure monitor at last visit estimated an average BP of 142/97.\par \par Since this time, he is doing well. His SBP was in the 120-140 range, on amlodipine 5 bid, and off of olmesartan. His numbers have been higher over the last week. He has been under stress and is eating poorly.\par He remains active and has no chest pain, dyspnea or palpitations.\par \par He had a coronary CTA which showed a calcium score of 205, with mild stenosis of his proximal and mid LAD. BW was notable for an LDL of 121.\par \par There is no family history of premature CAD.  He denies toxic habits.\par \par \par \par

## 2023-06-05 LAB
ALBUMIN SERPL ELPH-MCNC: 4.6 G/DL
ALDOSTERONE SERUM: 7.5 NG/DL
ALP BLD-CCNC: 49 U/L
ALT SERPL-CCNC: 52 U/L
ANION GAP SERPL CALC-SCNC: 11 MMOL/L
AST SERPL-CCNC: 33 U/L
BILIRUB SERPL-MCNC: 0.7 MG/DL
BUN SERPL-MCNC: 18 MG/DL
CALCIUM SERPL-MCNC: 10.1 MG/DL
CHLORIDE SERPL-SCNC: 101 MMOL/L
CHOLEST SERPL-MCNC: 263 MG/DL
CO2 SERPL-SCNC: 27 MMOL/L
CREAT SERPL-MCNC: 0.99 MG/DL
EGFR: 95 ML/MIN/1.73M2
ESTIMATED AVERAGE GLUCOSE: 108 MG/DL
GLUCOSE SERPL-MCNC: 97 MG/DL
HBA1C MFR BLD HPLC: 5.4 %
HDLC SERPL-MCNC: 72 MG/DL
LDLC SERPL CALC-MCNC: 169 MG/DL
NONHDLC SERPL-MCNC: 191 MG/DL
POTASSIUM SERPL-SCNC: 4.6 MMOL/L
PROT SERPL-MCNC: 7 G/DL
SODIUM SERPL-SCNC: 139 MMOL/L
TRIGL SERPL-MCNC: 108 MG/DL
TSH SERPL-ACNC: 2.3 UIU/ML

## 2023-06-06 LAB — RENIN PLASMA: 4.5 PG/ML

## 2023-06-07 LAB
CREAT UR-MCNC: 26.8 MG/DL
METANEPHRINE, PL: 29.6 PG/ML
METANEPHS 24H UR-MCNC: 21 UG/L
METANEPHS/CREAT UR: 0.3
NORMETANEPHRINE 24H UR-MCNC: 33 UG/L
NORMETANEPHRINE, PL: 79.2 PG/ML

## 2023-06-10 LAB — RENIN ACTIVITY, PLASMA: 0.39 NG/ML/HR

## 2023-08-10 ENCOUNTER — APPOINTMENT (OUTPATIENT)
Dept: MRI IMAGING | Facility: CLINIC | Age: 48
End: 2023-08-10
Payer: COMMERCIAL

## 2023-08-10 PROCEDURE — A9585: CPT

## 2023-08-10 PROCEDURE — 75561 CARDIAC MRI FOR MORPH W/DYE: CPT

## 2023-10-20 RX ORDER — FINASTERIDE 1 MG/1
1 TABLET ORAL DAILY
Qty: 90 | Refills: 3 | Status: ACTIVE | COMMUNITY
Start: 2023-10-20 | End: 1900-01-01

## 2023-10-23 ENCOUNTER — RX RENEWAL (OUTPATIENT)
Age: 48
End: 2023-10-23

## 2023-10-23 RX ORDER — AMLODIPINE BESYLATE 5 MG/1
5 TABLET ORAL
Qty: 30 | Refills: 5 | Status: ACTIVE | COMMUNITY
Start: 2023-02-07 | End: 1900-01-01

## 2024-02-16 ENCOUNTER — NON-APPOINTMENT (OUTPATIENT)
Age: 49
End: 2024-02-16

## 2024-02-16 ENCOUNTER — APPOINTMENT (OUTPATIENT)
Dept: CARDIOLOGY | Facility: CLINIC | Age: 49
End: 2024-02-16
Payer: COMMERCIAL

## 2024-02-16 VITALS
OXYGEN SATURATION: 99 % | BODY MASS INDEX: 26.96 KG/M2 | DIASTOLIC BLOOD PRESSURE: 90 MMHG | HEART RATE: 55 BPM | HEIGHT: 69 IN | SYSTOLIC BLOOD PRESSURE: 139 MMHG | WEIGHT: 182 LBS

## 2024-02-16 VITALS — SYSTOLIC BLOOD PRESSURE: 135 MMHG | DIASTOLIC BLOOD PRESSURE: 85 MMHG

## 2024-02-16 DIAGNOSIS — K21.9 GASTRO-ESOPHAGEAL REFLUX DISEASE W/OUT ESOPHAGITIS: ICD-10-CM

## 2024-02-16 DIAGNOSIS — R94.31 ABNORMAL ELECTROCARDIOGRAM [ECG] [EKG]: ICD-10-CM

## 2024-02-16 DIAGNOSIS — E78.5 HYPERLIPIDEMIA, UNSPECIFIED: ICD-10-CM

## 2024-02-16 DIAGNOSIS — I25.10 ATHEROSCLEROTIC HEART DISEASE OF NATIVE CORONARY ARTERY W/OUT ANGINA PECTORIS: ICD-10-CM

## 2024-02-16 PROCEDURE — 93000 ELECTROCARDIOGRAM COMPLETE: CPT

## 2024-02-16 PROCEDURE — 99214 OFFICE O/P EST MOD 30 MIN: CPT | Mod: 25

## 2024-02-16 RX ORDER — ROSUVASTATIN CALCIUM 10 MG/1
10 TABLET, FILM COATED ORAL DAILY
Qty: 90 | Refills: 2 | Status: ACTIVE | COMMUNITY
Start: 2023-06-02 | End: 1900-01-01

## 2024-02-16 RX ORDER — PANTOPRAZOLE 40 MG/1
40 TABLET, DELAYED RELEASE ORAL
Qty: 30 | Refills: 2 | Status: ACTIVE | COMMUNITY
Start: 2024-02-16 | End: 1900-01-01

## 2024-02-16 NOTE — DISCUSSION/SUMMARY
[FreeTextEntry1] : Julio recently presented with chest pain, and a significantly elevated blood pressure.  His EKG continues to demonstrate a sinus rhythm, with a diffuse T wave abnormality, and suggestion of left ventricular hypertrophy.  His blood pressure is elevated, though with improvement on repeat evaluation.  He will remain on amlodipine 5 mg twice daily, and off of olmesartan. I am repeating a full set of blood work in 2 weeks.  He will continue to monitor his blood pressure at home.  An echocardiogram during his hospitalization did not show definitive left ventricular hypertrophy, though the apex was foreshortened and appeared thickened.  His MRI showed normal LV function, and no suggestion of HCM.  He is going to fine tune his diet and will be compliant with his Crestor. He has mild CAD on CTA.  We will speak after the blood work.  I will see him again in 3-6 months. [EKG obtained to assist in diagnosis and management of assessed problem(s)] : EKG obtained to assist in diagnosis and management of assessed problem(s)

## 2024-03-01 LAB
BASOPHILS # BLD AUTO: 0.07 K/UL
BASOPHILS NFR BLD AUTO: 1.2 %
EOSINOPHIL # BLD AUTO: 0.13 K/UL
EOSINOPHIL NFR BLD AUTO: 2.2 %
HCT VFR BLD CALC: 41.9 %
HGB BLD-MCNC: 13.9 G/DL
IMM GRANULOCYTES NFR BLD AUTO: 0.2 %
LYMPHOCYTES # BLD AUTO: 1.62 K/UL
LYMPHOCYTES NFR BLD AUTO: 27.2 %
MAN DIFF?: NORMAL
MCHC RBC-ENTMCNC: 28.8 PG
MCHC RBC-ENTMCNC: 33.2 GM/DL
MCV RBC AUTO: 86.9 FL
MONOCYTES # BLD AUTO: 0.5 K/UL
MONOCYTES NFR BLD AUTO: 8.4 %
NEUTROPHILS # BLD AUTO: 3.62 K/UL
NEUTROPHILS NFR BLD AUTO: 60.8 %
PLATELET # BLD AUTO: 258 K/UL
RBC # BLD: 4.82 M/UL
RBC # FLD: 12.4 %
WBC # FLD AUTO: 5.95 K/UL

## 2024-03-04 LAB
25(OH)D3 SERPL-MCNC: 29.6 NG/ML
ALBUMIN SERPL ELPH-MCNC: 4.5 G/DL
ALP BLD-CCNC: 39 U/L
ALT SERPL-CCNC: 26 U/L
ANION GAP SERPL CALC-SCNC: 10 MMOL/L
APPEARANCE: CLEAR
AST SERPL-CCNC: 27 U/L
BACTERIA: NEGATIVE /HPF
BILIRUB SERPL-MCNC: 0.5 MG/DL
BILIRUBIN URINE: NEGATIVE
BLOOD URINE: NEGATIVE
BUN SERPL-MCNC: 14 MG/DL
CALCIUM SERPL-MCNC: 9.6 MG/DL
CAST: 0 /LPF
CHLORIDE SERPL-SCNC: 103 MMOL/L
CHOLEST SERPL-MCNC: 179 MG/DL
CO2 SERPL-SCNC: 27 MMOL/L
COLOR: YELLOW
CREAT SERPL-MCNC: 1.35 MG/DL
EGFR: 65 ML/MIN/1.73M2
EPITHELIAL CELLS: 0 /HPF
ESTIMATED AVERAGE GLUCOSE: 103 MG/DL
GLUCOSE QUALITATIVE U: NEGATIVE MG/DL
GLUCOSE SERPL-MCNC: 84 MG/DL
HBA1C MFR BLD HPLC: 5.2 %
HDLC SERPL-MCNC: 61 MG/DL
KETONES URINE: NEGATIVE MG/DL
LDLC SERPL CALC-MCNC: 95 MG/DL
LEUKOCYTE ESTERASE URINE: NEGATIVE
MICROSCOPIC-UA: NORMAL
NITRITE URINE: NEGATIVE
NONHDLC SERPL-MCNC: 119 MG/DL
PH URINE: 6.5
POTASSIUM SERPL-SCNC: 4.8 MMOL/L
PROT SERPL-MCNC: 7.2 G/DL
PROTEIN URINE: NEGATIVE MG/DL
PSA SERPL-MCNC: 0.23 NG/ML
RED BLOOD CELLS URINE: 1 /HPF
SODIUM SERPL-SCNC: 140 MMOL/L
SPECIFIC GRAVITY URINE: 1.02
TESTOST FREE SERPL-MCNC: 6.1 PG/ML
TESTOST SERPL-MCNC: 684 NG/DL
TRIGL SERPL-MCNC: 136 MG/DL
TSH SERPL-ACNC: 1.75 UIU/ML
UROBILINOGEN URINE: 0.2 MG/DL
WHITE BLOOD CELLS URINE: 0 /HPF

## 2024-11-25 ENCOUNTER — NON-APPOINTMENT (OUTPATIENT)
Age: 49
End: 2024-11-25

## 2024-11-25 ENCOUNTER — APPOINTMENT (OUTPATIENT)
Dept: CARDIOLOGY | Facility: CLINIC | Age: 49
End: 2024-11-25
Payer: COMMERCIAL

## 2024-11-25 VITALS
HEIGHT: 69 IN | OXYGEN SATURATION: 96 % | DIASTOLIC BLOOD PRESSURE: 86 MMHG | SYSTOLIC BLOOD PRESSURE: 139 MMHG | HEART RATE: 56 BPM | BODY MASS INDEX: 26.66 KG/M2 | WEIGHT: 180 LBS

## 2024-11-25 DIAGNOSIS — E78.5 HYPERLIPIDEMIA, UNSPECIFIED: ICD-10-CM

## 2024-11-25 DIAGNOSIS — I10 ESSENTIAL (PRIMARY) HYPERTENSION: ICD-10-CM

## 2024-11-25 PROCEDURE — 99214 OFFICE O/P EST MOD 30 MIN: CPT | Mod: 25

## 2024-11-25 PROCEDURE — 93000 ELECTROCARDIOGRAM COMPLETE: CPT

## 2024-12-09 ENCOUNTER — APPOINTMENT (OUTPATIENT)
Dept: CARDIOLOGY | Facility: CLINIC | Age: 49
End: 2024-12-09
Payer: COMMERCIAL

## 2024-12-09 PROCEDURE — 93306 TTE W/DOPPLER COMPLETE: CPT

## 2025-04-02 ENCOUNTER — APPOINTMENT (OUTPATIENT)
Dept: PAIN MANAGEMENT | Facility: CLINIC | Age: 50
End: 2025-04-02

## 2025-04-19 ENCOUNTER — RX RENEWAL (OUTPATIENT)
Age: 50
End: 2025-04-19

## 2025-04-30 ENCOUNTER — APPOINTMENT (OUTPATIENT)
Dept: PAIN MANAGEMENT | Facility: CLINIC | Age: 50
End: 2025-04-30
Payer: COMMERCIAL

## 2025-04-30 DIAGNOSIS — M47.812 SPONDYLOSIS W/OUT MYELOPATHY OR RADICULOPATHY, CERVICAL REGION: ICD-10-CM

## 2025-04-30 PROCEDURE — 99204 OFFICE O/P NEW MOD 45 MIN: CPT

## 2025-05-01 ENCOUNTER — TRANSCRIPTION ENCOUNTER (OUTPATIENT)
Age: 50
End: 2025-05-01

## 2025-05-01 NOTE — ASU DISCHARGE PLAN (ADULT/PEDIATRIC) - NS MD DC FALL RISK RISK
For information on Fall & Injury Prevention, visit: [email or web address] OR  [email or web address] OR  https://www.cdc.gov/steadi/patient.html

## 2025-05-01 NOTE — ASU DISCHARGE PLAN (ADULT/PEDIATRIC) - FINANCIAL ASSISTANCE
[facility] provides services at a reduced cost to those who are determined to be eligible through [facility]’s financial assistance program. Information regarding [facility]’s financial assistance program can be found by going to [email or web address] or by calling [phone].

## 2025-05-05 ENCOUNTER — OUTPATIENT (OUTPATIENT)
Dept: OUTPATIENT SERVICES | Facility: HOSPITAL | Age: 50
LOS: 1 days | End: 2025-05-05
Payer: COMMERCIAL

## 2025-05-05 ENCOUNTER — APPOINTMENT (OUTPATIENT)
Dept: ORTHOPEDIC SURGERY | Facility: HOSPITAL | Age: 50
End: 2025-05-05
Payer: COMMERCIAL

## 2025-05-05 VITALS
DIASTOLIC BLOOD PRESSURE: 96 MMHG | RESPIRATION RATE: 15 BRPM | HEART RATE: 45 BPM | TEMPERATURE: 98 F | OXYGEN SATURATION: 97 % | SYSTOLIC BLOOD PRESSURE: 146 MMHG

## 2025-05-05 VITALS
DIASTOLIC BLOOD PRESSURE: 97 MMHG | WEIGHT: 175.05 LBS | TEMPERATURE: 98 F | HEIGHT: 68 IN | HEART RATE: 58 BPM | SYSTOLIC BLOOD PRESSURE: 134 MMHG | OXYGEN SATURATION: 97 % | RESPIRATION RATE: 18 BRPM

## 2025-05-05 DIAGNOSIS — M47.812 SPONDYLOSIS WITHOUT MYELOPATHY OR RADICULOPATHY, CERVICAL REGION: ICD-10-CM

## 2025-05-05 PROCEDURE — 64491 INJ PARAVERT F JNT C/T 2 LEV: CPT | Mod: RT

## 2025-05-05 PROCEDURE — 64490 INJ PARAVERT F JNT C/T 1 LEV: CPT | Mod: RT

## 2025-05-05 PROCEDURE — 64491 INJ PARAVERT F JNT C/T 2 LEV: CPT | Mod: RT,59

## 2025-05-05 PROCEDURE — 64492 INJ PARAVERT F JNT C/T 3 LEV: CPT | Mod: RT,59

## 2025-05-05 PROCEDURE — 64492 INJ PARAVERT F JNT C/T 3 LEV: CPT | Mod: RT

## 2025-05-05 RX ORDER — AMLODIPINE BESYLATE 10 MG/1
1 TABLET ORAL
Refills: 0 | DISCHARGE

## 2025-05-05 RX ORDER — FINASTERIDE 1 MG/1
1 TABLET, FILM COATED ORAL
Refills: 0 | DISCHARGE

## 2025-05-05 RX ORDER — ROSUVASTATIN CALCIUM 20 MG/1
1 TABLET, FILM COATED ORAL
Refills: 0 | DISCHARGE

## 2025-05-05 RX ORDER — DICLOFENAC SODIUM 75 MG/1
1 TABLET, DELAYED RELEASE ORAL
Refills: 0 | DISCHARGE

## 2025-05-05 NOTE — ASU PREOP CHECKLIST - BP NONINVASIVE SYSTOLIC (MM HG)
Called pt to convey negative Lyme test result. Made aware. Pt verbalized understanding, no further questions.    134

## 2025-06-11 LAB
25(OH)D3 SERPL-MCNC: 28.8 NG/ML
ALBUMIN SERPL ELPH-MCNC: 4.2 G/DL
ALP BLD-CCNC: 50 U/L
ALT SERPL-CCNC: 59 U/L
ANION GAP SERPL CALC-SCNC: 12 MMOL/L
AST SERPL-CCNC: 34 U/L
BILIRUB SERPL-MCNC: 0.4 MG/DL
BUN SERPL-MCNC: 16 MG/DL
CALCIUM SERPL-MCNC: 9.6 MG/DL
CHLORIDE SERPL-SCNC: 102 MMOL/L
CHOLEST SERPL-MCNC: 168 MG/DL
CO2 SERPL-SCNC: 26 MMOL/L
CREAT SERPL-MCNC: 1.06 MG/DL
EGFRCR SERPLBLD CKD-EPI 2021: 86 ML/MIN/1.73M2
GLUCOSE SERPL-MCNC: 89 MG/DL
HCT VFR BLD CALC: 42.2 %
HDLC SERPL-MCNC: 81 MG/DL
HGB BLD-MCNC: 13.5 G/DL
LDLC SERPL-MCNC: 77 MG/DL
MCHC RBC-ENTMCNC: 28.8 PG
MCHC RBC-ENTMCNC: 32 G/DL
MCV RBC AUTO: 90.2 FL
NONHDLC SERPL-MCNC: 87 MG/DL
PLATELET # BLD AUTO: 263 K/UL
POTASSIUM SERPL-SCNC: 5.1 MMOL/L
PROT SERPL-MCNC: 6.6 G/DL
RBC # BLD: 4.68 M/UL
RBC # FLD: 12.9 %
SODIUM SERPL-SCNC: 139 MMOL/L
TRIGL SERPL-MCNC: 47 MG/DL
TSH SERPL-ACNC: 3.06 UIU/ML
WBC # FLD AUTO: 5.96 K/UL

## 2025-06-12 LAB
ESTIMATED AVERAGE GLUCOSE: 111 MG/DL
HBA1C MFR BLD HPLC: 5.5 %

## 2025-08-13 ENCOUNTER — NON-APPOINTMENT (OUTPATIENT)
Age: 50
End: 2025-08-13

## 2025-08-15 ENCOUNTER — APPOINTMENT (OUTPATIENT)
Dept: CARDIOLOGY | Facility: CLINIC | Age: 50
End: 2025-08-15
Payer: COMMERCIAL

## 2025-08-15 VITALS
OXYGEN SATURATION: 100 % | SYSTOLIC BLOOD PRESSURE: 132 MMHG | HEART RATE: 52 BPM | WEIGHT: 175 LBS | DIASTOLIC BLOOD PRESSURE: 88 MMHG | BODY MASS INDEX: 25.92 KG/M2 | HEIGHT: 69 IN

## 2025-08-15 VITALS — SYSTOLIC BLOOD PRESSURE: 132 MMHG | DIASTOLIC BLOOD PRESSURE: 82 MMHG

## 2025-08-15 DIAGNOSIS — I25.10 ATHEROSCLEROTIC HEART DISEASE OF NATIVE CORONARY ARTERY W/OUT ANGINA PECTORIS: ICD-10-CM

## 2025-08-15 DIAGNOSIS — R94.31 ABNORMAL ELECTROCARDIOGRAM [ECG] [EKG]: ICD-10-CM

## 2025-08-15 DIAGNOSIS — I10 ESSENTIAL (PRIMARY) HYPERTENSION: ICD-10-CM

## 2025-08-15 PROCEDURE — 99214 OFFICE O/P EST MOD 30 MIN: CPT | Mod: 25

## 2025-08-15 PROCEDURE — 93000 ELECTROCARDIOGRAM COMPLETE: CPT

## 2025-08-29 ENCOUNTER — APPOINTMENT (OUTPATIENT)
Dept: PAIN MANAGEMENT | Facility: CLINIC | Age: 50
End: 2025-08-29
Payer: COMMERCIAL

## 2025-08-29 DIAGNOSIS — M47.812 SPONDYLOSIS W/OUT MYELOPATHY OR RADICULOPATHY, CERVICAL REGION: ICD-10-CM

## 2025-08-29 PROBLEM — M79.2 NEUROPATHIC PAIN: Status: ACTIVE | Noted: 2025-08-29

## 2025-08-29 PROCEDURE — 99214 OFFICE O/P EST MOD 30 MIN: CPT | Mod: 93

## 2025-09-03 ENCOUNTER — TRANSCRIPTION ENCOUNTER (OUTPATIENT)
Age: 50
End: 2025-09-03

## 2025-09-08 ENCOUNTER — APPOINTMENT (OUTPATIENT)
Dept: ORTHOPEDIC SURGERY | Facility: HOSPITAL | Age: 50
End: 2025-09-08

## (undated) DEVICE — NDL SPINAL 22G X 3.5" QUINCKE

## (undated) DEVICE — KIT NERVE BLOCK

## (undated) DEVICE — GLV 8.5 PROTEXIS (WHITE)

## (undated) DEVICE — STYLET  ENDOTRACH 7.5MM X 10MM